# Patient Record
Sex: MALE | Race: AMERICAN INDIAN OR ALASKA NATIVE | URBAN - NONMETROPOLITAN AREA
[De-identification: names, ages, dates, MRNs, and addresses within clinical notes are randomized per-mention and may not be internally consistent; named-entity substitution may affect disease eponyms.]

---

## 2021-12-03 ENCOUNTER — OFFICE VISIT (OUTPATIENT)
Dept: URBAN - NONMETROPOLITAN AREA CLINIC 14 | Facility: CLINIC | Age: 46
End: 2021-12-03
Payer: COMMERCIAL

## 2021-12-03 PROCEDURE — 92004 COMPRE OPH EXAM NEW PT 1/>: CPT | Performed by: OPTOMETRIST

## 2021-12-03 ASSESSMENT — INTRAOCULAR PRESSURE
OS: 20
OD: 21

## 2021-12-03 ASSESSMENT — VISUAL ACUITY
OS: 20/20
OD: 20/20

## 2022-03-18 ENCOUNTER — OFFICE VISIT (OUTPATIENT)
Dept: URBAN - METROPOLITAN AREA CLINIC 13 | Facility: CLINIC | Age: 47
End: 2022-03-18
Payer: COMMERCIAL

## 2022-03-18 DIAGNOSIS — H33.311 HORSESHOE TEAR OF RETINA WITHOUT DETACHMENT, RIGHT EYE: ICD-10-CM

## 2022-03-18 DIAGNOSIS — H52.13 MYOPIA, BILATERAL: ICD-10-CM

## 2022-03-18 DIAGNOSIS — H25.13 AGE-RELATED NUCLEAR CATARACT, BILATERAL: ICD-10-CM

## 2022-03-18 DIAGNOSIS — H43.11 VITREOUS HEMORRHAGE, RIGHT EYE: ICD-10-CM

## 2022-03-18 DIAGNOSIS — H33.021 RETINAL DETACHMENT WITH MULTIPLE BREAKS, RIGHT EYE: Primary | ICD-10-CM

## 2022-03-18 PROCEDURE — 99205 OFFICE O/P NEW HI 60 MIN: CPT | Performed by: OPHTHALMOLOGY

## 2022-03-18 PROCEDURE — 92134 CPTRZ OPH DX IMG PST SGM RTA: CPT | Performed by: OPHTHALMOLOGY

## 2022-03-18 RX ORDER — PREDNISOLONE ACETATE 10 MG/ML
1 % SUSPENSION/ DROPS OPHTHALMIC
Qty: 5 | Refills: 2 | Status: ACTIVE
Start: 2022-03-18

## 2022-03-18 RX ORDER — OFLOXACIN 3 MG/ML
0.3 % SOLUTION/ DROPS OPHTHALMIC
Qty: 5 | Refills: 2 | Status: ACTIVE
Start: 2022-03-18

## 2022-03-18 ASSESSMENT — INTRAOCULAR PRESSURE
OD: 17
OS: 15

## 2022-03-18 NOTE — IMPRESSION/PLAN
Impression: Age-related nuclear cataract OU Plan: Mild, observe.  Pt understands that NS will progress following PPV

## 2022-03-18 NOTE — IMPRESSION/PLAN
Impression: Retinal detachment with multiple breaks OD Plan: Unfortunately the patient has developed a macula on retinal detachment. The diagnosis, natural history, and prognosis of rhegmatogenous retinal detachment, as well as the risks and benefits of surgical intervention versus observation were discussed at length. To reduce the risk of further visual loss, surgical correction is strongly recommended. The patient was informed of various surgical techniques; altitude precautions with a gas bubble, including the danger of loss of the eye with travel to a higher altitude or flying; and the possible need to update spectacle correction if a scleral buckle is used. The patient understands that the vision usually improves gradually over a period of several months to 1 year, but may not improve to prior levels. The patient elects to proceed with retinal detachment repair. Optos colors taken for documentation. 

25g PPV/EL/Oil OD (pt lives in [facility])

## 2022-03-18 NOTE — IMPRESSION/PLAN
Impression: Horseshoe tear of retina without detachment OD Plan: Multiple additional breaks noted on exam, will Tx at time of Sx.

## 2022-03-22 ENCOUNTER — Encounter (OUTPATIENT)
Dept: URBAN - METROPOLITAN AREA EXTERNAL CLINIC 14 | Facility: EXTERNAL CLINIC | Age: 47
End: 2022-03-22
Payer: COMMERCIAL

## 2022-03-22 PROCEDURE — 67113 REPAIR RETINAL DETACH CPLX: CPT | Performed by: OPHTHALMOLOGY

## 2022-03-23 ENCOUNTER — POST-OPERATIVE VISIT (OUTPATIENT)
Dept: URBAN - METROPOLITAN AREA CLINIC 41 | Facility: CLINIC | Age: 47
End: 2022-03-23
Payer: COMMERCIAL

## 2022-03-23 PROCEDURE — 99024 POSTOP FOLLOW-UP VISIT: CPT | Performed by: OPHTHALMOLOGY

## 2022-03-23 ASSESSMENT — INTRAOCULAR PRESSURE
OD: 18
OS: 17

## 2022-03-23 NOTE — IMPRESSION/PLAN
Impression: S/P 25g PPV/EL/Oil OD - 1 Day. Retinal detachment with multiple breaks, right eye  H33.021. Plan: Excellent post op course. Post operative instructions reviewed. Condition is improving. 

RTC: 1 week POS OD

## 2022-04-04 ENCOUNTER — POST-OPERATIVE VISIT (OUTPATIENT)
Dept: URBAN - METROPOLITAN AREA CLINIC 41 | Facility: CLINIC | Age: 47
End: 2022-04-04
Payer: COMMERCIAL

## 2022-04-04 PROCEDURE — 99024 POSTOP FOLLOW-UP VISIT: CPT | Performed by: OPHTHALMOLOGY

## 2022-04-04 RX ORDER — DORZOLAMIDE HYDROCHLORIDE AND TIMOLOL MALEATE 20; 5 MG/ML; MG/ML
SOLUTION/ DROPS OPHTHALMIC
Qty: 5 | Refills: 3 | Status: ACTIVE
Start: 2022-04-04

## 2022-04-04 ASSESSMENT — INTRAOCULAR PRESSURE
OS: 17
OD: 30

## 2022-04-04 NOTE — IMPRESSION/PLAN
Impression: S/P 25g PPV/EL/Oil OD - 13 Days. Retinal detachment with multiple breaks, right eye  H33.021.  Excellent post op course   Post operative instructions reviewed - Condition is improving.
- Start Cosopt BID OD Plan: RTC - 4-6 wks POS OD w/ OCT OU --Taper Prednisolone acetate 1% TID x 1 wk, BID x 1wk, QD x 1wk, then d/c--Discontinue Ofloxacin 0.3%

## 2022-05-13 ENCOUNTER — POST-OPERATIVE VISIT (OUTPATIENT)
Dept: URBAN - METROPOLITAN AREA CLINIC 41 | Facility: CLINIC | Age: 47
End: 2022-05-13
Payer: COMMERCIAL

## 2022-05-13 DIAGNOSIS — H33.021 RETINAL DETACHMENT WITH MULTIPLE BREAKS, RIGHT EYE: Primary | ICD-10-CM

## 2022-05-13 PROCEDURE — 99024 POSTOP FOLLOW-UP VISIT: CPT | Performed by: OPHTHALMOLOGY

## 2022-05-13 ASSESSMENT — INTRAOCULAR PRESSURE
OS: 15
OD: 18

## 2022-05-13 NOTE — IMPRESSION/PLAN
Impression: S/P 25g PPV/EL/Oil OD - 52 Days. Retinal detachment with multiple breaks, right eye  H33.021. Plan: --Excellent post op course   
--Post operative instructions reviewed 
--Condition is improving
--If attached without regrowth of PVR, will consider SO out at next appt. --Will d/c cosopt RTC 2-3 months DFE/OCT OU/Optos Colors

## 2022-07-08 ENCOUNTER — OFFICE VISIT (OUTPATIENT)
Dept: URBAN - METROPOLITAN AREA CLINIC 41 | Facility: CLINIC | Age: 47
End: 2022-07-08
Payer: COMMERCIAL

## 2022-07-08 DIAGNOSIS — H33.021 RETINAL DETACHMENT WITH MULTIPLE BREAKS, RIGHT EYE: Primary | ICD-10-CM

## 2022-07-08 DIAGNOSIS — H25.13 AGE-RELATED NUCLEAR CATARACT, BILATERAL: ICD-10-CM

## 2022-07-08 DIAGNOSIS — H33.41 PROLIFERATIVE VITREO-RETINOPATHY W/ RETINAL DETACHMENT OF RIGHT EYE: ICD-10-CM

## 2022-07-08 PROCEDURE — 92134 CPTRZ OPH DX IMG PST SGM RTA: CPT | Performed by: OPHTHALMOLOGY

## 2022-07-08 PROCEDURE — 92014 COMPRE OPH EXAM EST PT 1/>: CPT | Performed by: OPHTHALMOLOGY

## 2022-07-08 ASSESSMENT — INTRAOCULAR PRESSURE
OD: 17
OS: 15

## 2022-07-08 NOTE — IMPRESSION/PLAN
Impression: Age-related nuclear cataract, bilateral: H25.13. Plan: Worsening of cataract OD after RD repair. Will refer to Dr. Ruth Waters to complete cataract surgery while SO remains in the eye.

## 2022-07-08 NOTE — IMPRESSION/PLAN
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  Plan: as above

## 2022-07-08 NOTE — IMPRESSION/PLAN
Impression: Retinal detachment with multiple breaks, right eye  H33.021.
- history of mac on RD with VH and early PVR
-s/p 25g PPV/MP/EL/Oil OD  (03/22/2022)-SI Plan: Exam and OCT demonstrates retina remains flat, however there is early recurrence of PVR peripherally. Will have patient undergo cataract surgery with Dr. Akira De León while SO remains in the eye, then will proceed with further evaluation after cataract surgery has take place.   

RTC 2 months DFE/OCT OU/Optos Colors

## 2022-08-26 ENCOUNTER — OFFICE VISIT (OUTPATIENT)
Dept: URBAN - METROPOLITAN AREA CLINIC 16 | Facility: CLINIC | Age: 47
End: 2022-08-26
Payer: OTHER GOVERNMENT

## 2022-08-26 DIAGNOSIS — H25.11 AGE-RELATED NUCLEAR CATARACT, RIGHT EYE: Primary | ICD-10-CM

## 2022-08-26 PROCEDURE — 92004 COMPRE OPH EXAM NEW PT 1/>: CPT | Performed by: OPHTHALMOLOGY

## 2022-08-26 ASSESSMENT — VISUAL ACUITY
OS: 20/25
OD: 20/400

## 2022-08-26 ASSESSMENT — INTRAOCULAR PRESSURE
OS: 14
OD: 20

## 2022-08-26 NOTE — IMPRESSION/PLAN
Impression: Age-related nuclear cataract, right eye: H25.11. [place]oo Visually significant, quality of life issue, could improve with surgery. Plan: Cataracts account for the patient's complaints. Discussed all risks, benefits, alternatives, procedures and recovery. Patient understands changing glasses will not improve vision. Patient desires to have surgery, recommend phacoemulsification with intraocular lens implant OD.  lvl 2 - pt declines premium IOL - AIM -3.00. Re-evaluate OS for possible cataract surgery after OD is done. Not OK for Dexcyu. Pt. has silicone oil in OD eye and Ascan needs to compensate for that in OD. Acrylic IOL only.

## 2022-09-09 ENCOUNTER — OFFICE VISIT (OUTPATIENT)
Dept: URBAN - METROPOLITAN AREA CLINIC 41 | Facility: CLINIC | Age: 47
End: 2022-09-09
Payer: OTHER GOVERNMENT

## 2022-09-09 DIAGNOSIS — H25.13 AGE-RELATED NUCLEAR CATARACT, BILATERAL: ICD-10-CM

## 2022-09-09 DIAGNOSIS — H33.41 PROLIFERATIVE VITREO-RETINOPATHY W/ RETINAL DETACHMENT OF RIGHT EYE: ICD-10-CM

## 2022-09-09 DIAGNOSIS — H33.021 RETINAL DETACHMENT WITH MULTIPLE BREAKS, RIGHT EYE: Primary | ICD-10-CM

## 2022-09-09 PROCEDURE — 92134 CPTRZ OPH DX IMG PST SGM RTA: CPT | Performed by: OPHTHALMOLOGY

## 2022-09-09 PROCEDURE — 92014 COMPRE OPH EXAM EST PT 1/>: CPT | Performed by: OPHTHALMOLOGY

## 2022-09-09 ASSESSMENT — INTRAOCULAR PRESSURE
OD: 19
OS: 16

## 2022-09-09 NOTE — IMPRESSION/PLAN
Impression: Age-related nuclear cataract, bilateral: H25.13. Plan: Worsening of cataract OD after RD repair. He is scheduled for cataract surgery OD soon with Dr. Pablo Yuan.

## 2022-09-19 ENCOUNTER — PRE-OPERATIVE VISIT (OUTPATIENT)
Dept: URBAN - METROPOLITAN AREA CLINIC 23 | Facility: CLINIC | Age: 47
End: 2022-09-19
Payer: OTHER GOVERNMENT

## 2022-09-19 DIAGNOSIS — H25.11 AGE-RELATED NUCLEAR CATARACT, RIGHT EYE: Primary | ICD-10-CM

## 2022-09-19 ASSESSMENT — PACHYMETRY
OD: 3.00
OD: 26.72
OS: 3.05
OS: 26.62

## 2022-09-26 ENCOUNTER — SURGERY (OUTPATIENT)
Dept: URBAN - METROPOLITAN AREA SURGERY 11 | Facility: SURGERY | Age: 47
End: 2022-09-26
Payer: OTHER GOVERNMENT

## 2022-09-26 PROCEDURE — 66982 XCAPSL CTRC RMVL CPLX WO ECP: CPT | Performed by: OPHTHALMOLOGY

## 2022-09-27 ENCOUNTER — POST-OPERATIVE VISIT (OUTPATIENT)
Dept: URBAN - METROPOLITAN AREA CLINIC 16 | Facility: CLINIC | Age: 47
End: 2022-09-27
Payer: OTHER GOVERNMENT

## 2022-09-27 DIAGNOSIS — Z48.810 ENCOUNTER FOR SURGICAL AFTERCARE FOLLOWING SURGERY ON A SENSE ORGAN: Primary | ICD-10-CM

## 2022-09-27 PROCEDURE — 99024 POSTOP FOLLOW-UP VISIT: CPT | Performed by: OPTOMETRIST

## 2022-09-27 ASSESSMENT — INTRAOCULAR PRESSURE
OD: 18
OS: 18

## 2022-10-04 ENCOUNTER — POST-OPERATIVE VISIT (OUTPATIENT)
Dept: URBAN - METROPOLITAN AREA CLINIC 16 | Facility: CLINIC | Age: 47
End: 2022-10-04
Payer: OTHER GOVERNMENT

## 2022-10-04 DIAGNOSIS — Z48.810 ENCOUNTER FOR SURGICAL AFTERCARE FOLLOWING SURGERY ON A SENSE ORGAN: Primary | ICD-10-CM

## 2022-10-04 PROCEDURE — 99024 POSTOP FOLLOW-UP VISIT: CPT | Performed by: OPTOMETRIST

## 2022-10-04 RX ORDER — OFLOXACIN 3 MG/ML
0.3 % SOLUTION/ DROPS OPHTHALMIC
Qty: 5 | Refills: 0 | Status: ACTIVE
Start: 2022-10-04

## 2022-10-04 RX ORDER — PREDNISOLONE ACETATE 10 MG/ML
1 % SUSPENSION/ DROPS OPHTHALMIC
Qty: 5 | Refills: 0 | Status: ACTIVE
Start: 2022-10-04

## 2022-10-04 NOTE — IMPRESSION/PLAN
Impression: S/P Cataract Extraction by phacoemulsification with IOL placement OD - 8 Days. Encounter for surgical aftercare following surgery on a sense organ  Z48.810. Plan: RTC 1-2 weeks x follow up. Begin ofloxacin QID OD. Begin Pred acetate QID OD. Samples given in office today. RTC 1-2 weeks x PO.

## 2022-10-17 ENCOUNTER — POST-OPERATIVE VISIT (OUTPATIENT)
Dept: URBAN - METROPOLITAN AREA CLINIC 16 | Facility: CLINIC | Age: 47
End: 2022-10-17
Payer: OTHER GOVERNMENT

## 2022-10-17 DIAGNOSIS — Z48.810 ENCOUNTER FOR SURGICAL AFTERCARE FOLLOWING SURGERY ON A SENSE ORGAN: Primary | ICD-10-CM

## 2022-10-17 PROCEDURE — 99024 POSTOP FOLLOW-UP VISIT: CPT | Performed by: OPTOMETRIST

## 2022-10-17 ASSESSMENT — INTRAOCULAR PRESSURE
OD: 16
OS: 16

## 2022-10-17 NOTE — IMPRESSION/PLAN
Impression: S/P Cataract Extraction by phacoemulsification with IOL placement OD - 21 Days. Encounter for surgical aftercare following surgery on a sense organ  Z48.810. Plan: Return to specialist for follow up as scheduled. --Advised patient to use artificial tears for comfort. --Taper all meds as directed

## 2022-12-02 ENCOUNTER — OFFICE VISIT (OUTPATIENT)
Dept: URBAN - METROPOLITAN AREA CLINIC 41 | Facility: CLINIC | Age: 47
End: 2022-12-02
Payer: OTHER GOVERNMENT

## 2022-12-02 DIAGNOSIS — H33.021 RETINAL DETACHMENT WITH MULTIPLE BREAKS, RIGHT EYE: Primary | ICD-10-CM

## 2022-12-02 DIAGNOSIS — H25.12 AGE-RELATED NUCLEAR CATARACT, LEFT EYE: ICD-10-CM

## 2022-12-02 DIAGNOSIS — Z96.1 PRESENCE OF INTRAOCULAR LENS: ICD-10-CM

## 2022-12-02 DIAGNOSIS — H33.41 PROLIFERATIVE VITREO-RETINOPATHY W/ RETINAL DETACHMENT OF RIGHT EYE: ICD-10-CM

## 2022-12-02 DIAGNOSIS — H25.13 AGE-RELATED NUCLEAR CATARACT, BILATERAL: ICD-10-CM

## 2022-12-02 PROCEDURE — 92134 CPTRZ OPH DX IMG PST SGM RTA: CPT | Performed by: OPHTHALMOLOGY

## 2022-12-02 PROCEDURE — 92014 COMPRE OPH EXAM EST PT 1/>: CPT | Performed by: OPHTHALMOLOGY

## 2022-12-02 ASSESSMENT — INTRAOCULAR PRESSURE
OS: 29
OD: 24

## 2022-12-02 NOTE — IMPRESSION/PLAN
Impression: Presence of intraocular lens: Z96.1.
s/p CE/PCIOL (Dr. Salinas Martinez) Plan: Looks great

## 2022-12-02 NOTE — IMPRESSION/PLAN
Impression: Retinal detachment with multiple breaks, right eye  H33.021.
- history of mac on RD with VH and early PVR
-s/p 25g PPV/MP/EL/Oil OD  (03/22/2022)-SI Plan: Exam and OCT demonstrates retina remains flat, however there is early recurrence of PVR peripherally.   
Rec surgery to repair RD/PVR and remove ERM from SO.

RTC 25G PPV/SO OUT/MP/ICG/ILM/EL/POSSIBLE RETINECTOMY/LIKELY SO IN x RD/PVR/ERM/SOfill OD

## 2023-01-18 ENCOUNTER — POST-OPERATIVE VISIT (OUTPATIENT)
Dept: URBAN - METROPOLITAN AREA CLINIC 13 | Facility: CLINIC | Age: 48
End: 2023-01-18
Payer: OTHER GOVERNMENT

## 2023-01-18 DIAGNOSIS — H33.021 RETINAL DETACHMENT WITH MULTIPLE BREAKS, RIGHT EYE: Primary | ICD-10-CM

## 2023-01-18 PROCEDURE — 99024 POSTOP FOLLOW-UP VISIT: CPT | Performed by: OPHTHALMOLOGY

## 2023-01-18 ASSESSMENT — INTRAOCULAR PRESSURE
OD: 17
OS: 19

## 2023-01-18 NOTE — IMPRESSION/PLAN
Impression: S/P 25G PPV/SO OUT/MP/ICG/ILM/EL/POSSIBLE RETINECTOMY/LIKELY SO IN x RD/PVR/ERM/SOfill OD - 1 Day. Retinal detachment with multiple breaks, right eye  H33.021. Plan: No infection. IOP satisfactory. Retina remains attached. Patient is doing well. Starting Prednisolone and Ofloxacin QID OD. Return in 1 week pos

## 2023-01-27 ENCOUNTER — POST-OPERATIVE VISIT (OUTPATIENT)
Dept: URBAN - METROPOLITAN AREA CLINIC 41 | Facility: CLINIC | Age: 48
End: 2023-01-27
Payer: OTHER GOVERNMENT

## 2023-01-27 DIAGNOSIS — H33.41 PROLIFERATIVE VITREO-RETINOPATHY W/ RETINAL DETACHMENT OF RIGHT EYE: Primary | ICD-10-CM

## 2023-01-27 PROCEDURE — 99024 POSTOP FOLLOW-UP VISIT: CPT | Performed by: OPHTHALMOLOGY

## 2023-01-27 ASSESSMENT — INTRAOCULAR PRESSURE
OS: 9
OD: 15

## 2023-01-27 NOTE — IMPRESSION/PLAN
Impression: S/P 25G PPV/SO OUT/MP/ICG/ILM/EL/POSSIBLE RETINECTOMY/LIKELY SO IN OD - 10 Days. 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 Plan: --Excellent post op course   
--Condition is improving 
--Taper Prednisolone acetate 1% TID x 1 wk, BID x 1wk, QD x 1wk, then stop
--Discontinue Ofloxacin 0.3% RTC: 4 wks POS OD with OCT OU

## 2023-02-24 ENCOUNTER — POST-OPERATIVE VISIT (OUTPATIENT)
Dept: URBAN - METROPOLITAN AREA CLINIC 41 | Facility: CLINIC | Age: 48
End: 2023-02-24
Payer: OTHER GOVERNMENT

## 2023-02-24 DIAGNOSIS — H33.41 PROLIFERATIVE VITREO-RETINOPATHY W/ RETINAL DETACHMENT OF RIGHT EYE: Primary | ICD-10-CM

## 2023-02-24 PROCEDURE — 99024 POSTOP FOLLOW-UP VISIT: CPT | Performed by: OPHTHALMOLOGY

## 2023-02-24 ASSESSMENT — INTRAOCULAR PRESSURE
OD: 22
OS: 15

## 2023-02-24 NOTE — IMPRESSION/PLAN
Impression: S/P 25G PPV/SO OUT/MP/ICG/ILM/EL OD - 38 Days. 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 Plan: --Excellent post op course   
--Post operative instructions reviewed 
--Condition is improving 
-- cleared to proceed with new Mrx. 

RTC: 3 months DFE/OCT OU

## 2023-04-07 ENCOUNTER — OFFICE VISIT (OUTPATIENT)
Dept: URBAN - NONMETROPOLITAN AREA CLINIC 14 | Facility: CLINIC | Age: 48
End: 2023-04-07
Payer: COMMERCIAL

## 2023-04-07 DIAGNOSIS — H52.13 MYOPIA, BILATERAL: Primary | ICD-10-CM

## 2023-04-07 DIAGNOSIS — H33.011 RETINAL DETACHMENT WITH SINGLE BREAK, RIGHT EYE: ICD-10-CM

## 2023-04-07 PROCEDURE — 92014 COMPRE OPH EXAM EST PT 1/>: CPT | Performed by: OPTOMETRIST

## 2023-04-07 ASSESSMENT — INTRAOCULAR PRESSURE
OS: 10
OD: 22
OD: 28

## 2023-04-07 ASSESSMENT — VISUAL ACUITY
OS: 20/20
OD: 20/50

## 2023-04-07 NOTE — IMPRESSION/PLAN
Impression: Retinal detachment with single break, right eye: H33.011. Plan: doing well. Continue follow up as scheduled.

## 2023-11-03 ENCOUNTER — OFFICE VISIT (OUTPATIENT)
Dept: URBAN - METROPOLITAN AREA CLINIC 41 | Facility: CLINIC | Age: 48
End: 2023-11-03
Payer: OTHER GOVERNMENT

## 2023-11-03 DIAGNOSIS — H33.312 HORSESHOE TEAR OF RETINA WITHOUT DETACHMENT, LEFT EYE: ICD-10-CM

## 2023-11-03 DIAGNOSIS — H33.011 RETINAL DETACHMENT WITH SINGLE BREAK, RIGHT EYE: ICD-10-CM

## 2023-11-03 DIAGNOSIS — H25.12 AGE-RELATED NUCLEAR CATARACT, LEFT EYE: ICD-10-CM

## 2023-11-03 DIAGNOSIS — H33.41 PROLIFERATIVE VITREO-RETINOPATHY W/ RETINAL DETACHMENT OF RIGHT EYE: Primary | ICD-10-CM

## 2023-11-03 DIAGNOSIS — Z96.1 PRESENCE OF INTRAOCULAR LENS: ICD-10-CM

## 2023-11-03 PROCEDURE — 92134 CPTRZ OPH DX IMG PST SGM RTA: CPT | Performed by: OPHTHALMOLOGY

## 2023-11-03 PROCEDURE — 67145 PROPH RTA DTCHMNT PC: CPT | Performed by: OPHTHALMOLOGY

## 2023-11-03 PROCEDURE — 92014 COMPRE OPH EXAM EST PT 1/>: CPT | Performed by: OPHTHALMOLOGY

## 2023-11-03 ASSESSMENT — INTRAOCULAR PRESSURE
OS: 16
OD: 22

## 2024-01-01 NOTE — IMPRESSION/PLAN
Impression: S/P Cataract Extraction by phacoemulsification with IOL placement OD - 1 Day. Encounter for surgical aftercare following surgery on a sense organ  Z48.470. Plan: RTC as scheduled. --Advised patient to use artificial tears for comfort.
16

## 2024-06-14 ENCOUNTER — OFFICE VISIT (OUTPATIENT)
Dept: URBAN - NONMETROPOLITAN AREA CLINIC 14 | Facility: CLINIC | Age: 49
End: 2024-06-14
Payer: COMMERCIAL

## 2024-06-14 DIAGNOSIS — H52.13 MYOPIA, BILATERAL: Primary | ICD-10-CM

## 2024-06-14 PROCEDURE — 92014 COMPRE OPH EXAM EST PT 1/>: CPT | Performed by: OPTOMETRIST

## 2024-06-14 ASSESSMENT — VISUAL ACUITY
OD: 20/200
OS: 20/20

## 2024-06-14 ASSESSMENT — INTRAOCULAR PRESSURE
OS: 18
OD: 21